# Patient Record
Sex: MALE | Race: WHITE | NOT HISPANIC OR LATINO | URBAN - METROPOLITAN AREA
[De-identification: names, ages, dates, MRNs, and addresses within clinical notes are randomized per-mention and may not be internally consistent; named-entity substitution may affect disease eponyms.]

---

## 2024-03-05 ENCOUNTER — APPOINTMENT (OUTPATIENT)
Dept: URBAN - METROPOLITAN AREA CLINIC 204 | Age: 74
Setting detail: DERMATOLOGY
End: 2024-03-06

## 2024-03-05 PROCEDURE — 17000 DESTRUCT PREMALG LESION: CPT

## 2024-03-05 PROCEDURE — 17003 DESTRUCT PREMALG LES 2-14: CPT

## 2024-03-05 PROCEDURE — OTHER MIPS QUALITY: OTHER

## 2024-03-05 PROCEDURE — 99213 OFFICE O/P EST LOW 20 MIN: CPT | Mod: 25

## 2024-03-05 NOTE — PROCEDURE: MIPS QUALITY
Quality 226: Preventive Care And Screening: Tobacco Use: Screening And Cessation Intervention: Patient screened for tobacco use and is an ex/non-smoker
Additional Notes: Documentation for MIPS  purposes only. Full Patient visit note from paper chart is available for review and also scanned in EMA chart.
Detail Level: Detailed
Quality 47: Advance Care Plan: Advance Care Planning discussed and documented; advance care plan or surrogate decision maker documented in the medical record.
Quality 130: Documentation Of Current Medications In The Medical Record: Current Medications Documented

## 2024-09-05 ENCOUNTER — APPOINTMENT (OUTPATIENT)
Dept: PRIMARY CARE | Facility: CLINIC | Age: 74
End: 2024-09-05

## 2024-09-05 VITALS
BODY MASS INDEX: 34.39 KG/M2 | HEIGHT: 70 IN | WEIGHT: 240.2 LBS | SYSTOLIC BLOOD PRESSURE: 118 MMHG | DIASTOLIC BLOOD PRESSURE: 66 MMHG | HEART RATE: 60 BPM

## 2024-09-05 DIAGNOSIS — I48.0 PAROXYSMAL ATRIAL FIBRILLATION (MULTI): ICD-10-CM

## 2024-09-05 DIAGNOSIS — Z12.5 PROSTATE CANCER SCREENING: ICD-10-CM

## 2024-09-05 DIAGNOSIS — I10 PRIMARY HYPERTENSION: ICD-10-CM

## 2024-09-05 DIAGNOSIS — G47.33 OSA ON CPAP: ICD-10-CM

## 2024-09-05 DIAGNOSIS — E78.5 DYSLIPIDEMIA: Primary | ICD-10-CM

## 2024-09-05 PROCEDURE — 3078F DIAST BP <80 MM HG: CPT | Performed by: STUDENT IN AN ORGANIZED HEALTH CARE EDUCATION/TRAINING PROGRAM

## 2024-09-05 PROCEDURE — 99204 OFFICE O/P NEW MOD 45 MIN: CPT | Performed by: STUDENT IN AN ORGANIZED HEALTH CARE EDUCATION/TRAINING PROGRAM

## 2024-09-05 PROCEDURE — 1159F MED LIST DOCD IN RCRD: CPT | Performed by: STUDENT IN AN ORGANIZED HEALTH CARE EDUCATION/TRAINING PROGRAM

## 2024-09-05 PROCEDURE — 1036F TOBACCO NON-USER: CPT | Performed by: STUDENT IN AN ORGANIZED HEALTH CARE EDUCATION/TRAINING PROGRAM

## 2024-09-05 PROCEDURE — 3074F SYST BP LT 130 MM HG: CPT | Performed by: STUDENT IN AN ORGANIZED HEALTH CARE EDUCATION/TRAINING PROGRAM

## 2024-09-05 PROCEDURE — 1160F RVW MEDS BY RX/DR IN RCRD: CPT | Performed by: STUDENT IN AN ORGANIZED HEALTH CARE EDUCATION/TRAINING PROGRAM

## 2024-09-05 RX ORDER — METOPROLOL SUCCINATE 50 MG/1
50 TABLET, EXTENDED RELEASE ORAL DAILY
COMMUNITY

## 2024-09-05 RX ORDER — ATORVASTATIN CALCIUM 10 MG/1
10 TABLET, FILM COATED ORAL DAILY
COMMUNITY

## 2024-09-05 RX ORDER — FLECAINIDE ACETATE 150 MG/1
150 TABLET ORAL 2 TIMES DAILY
COMMUNITY

## 2024-09-05 RX ORDER — APIXABAN 5 MG/1
5 TABLET, FILM COATED ORAL 2 TIMES DAILY
COMMUNITY

## 2024-09-05 ASSESSMENT — PATIENT HEALTH QUESTIONNAIRE - PHQ9
SUM OF ALL RESPONSES TO PHQ9 QUESTIONS 1 AND 2: 0
1. LITTLE INTEREST OR PLEASURE IN DOING THINGS: NOT AT ALL
2. FEELING DOWN, DEPRESSED OR HOPELESS: NOT AT ALL

## 2024-09-05 NOTE — PROGRESS NOTES
"Subjective   Patient ID: Carlton Martinez is a 74 y.o. male who presents for establish care. Previous provider Dr Leonard. Having some sob with exertion.     HPI  A fib - follows with [facility] cardiology in [place], scheduled to see in 3/2025 when returns from [place], last eval 3/2024, originally diagnosed about 6yr ago, managed on BB, flecainide, and Eliquis, when in a fib has SOB and feels flu-y, states episodes are sporadic, on average 1 every 3-4mo, symptoms improve with rest, last about 12hr on average, had stress test 5/2022 which was wnl,  having some dyspnea on exertion for at least a few years, has some wheezing which is only present when bending at the waist    HLD - managed on statin which he tolerates without adverse effects, tries to be mindful of diet, is active but no formal exercise    HTN - BP at goal, feeling well at this time aside from occasional dyspnea    MARIE - managed on CPAP, follows with Dr. Moseley    Prostate Cancer Screening: no fhx, due  Colon Cancer Screening: no fhx, asymptomatic, Dr. Fonesca 1/4/2018  Tobacco: never  Alcohol: denies  Recreational Drugs: denies  Immunizations: considering RSV, otherwise utd    Review of Systems   Constitutional:  Negative for chills and fever.   HENT:  Negative for congestion and rhinorrhea.    Eyes:  Negative for redness.   Respiratory:  Positive for shortness of breath (intermittent). Negative for cough.    Cardiovascular:  Negative for chest pain, palpitations and leg swelling.   Gastrointestinal:  Negative for abdominal pain, blood in stool, constipation, diarrhea, nausea and vomiting.   Genitourinary:  Negative for dysuria and flank pain.   Musculoskeletal:  Negative for arthralgias and myalgias.   Skin:  Negative for rash.   Neurological:  Negative for dizziness and headaches.   Psychiatric/Behavioral:  Negative for dysphoric mood. The patient is not nervous/anxious.      Objective   /66   Pulse 60   Ht 1.778 m (5' 10\")   Wt 109 kg (240 lb " 3.2 oz)   BMI 34.47 kg/m²     Physical Exam  Vitals reviewed.   Constitutional:       General: He is not in acute distress.     Appearance: Normal appearance.   HENT:      Head: Normocephalic and atraumatic.   Eyes:      General: No scleral icterus.     Conjunctiva/sclera: Conjunctivae normal.   Cardiovascular:      Rate and Rhythm: Normal rate and regular rhythm.   Pulmonary:      Effort: Pulmonary effort is normal. No respiratory distress.      Breath sounds: Normal breath sounds.   Abdominal:      General: Bowel sounds are normal. There is no distension.      Palpations: Abdomen is soft.      Tenderness: There is no abdominal tenderness. There is no guarding or rebound.   Musculoskeletal:         General: No deformity.      Cervical back: Normal range of motion and neck supple.   Skin:     General: Skin is warm and dry.      Findings: No rash.   Neurological:      General: No focal deficit present.      Mental Status: He is alert.   Psychiatric:         Mood and Affect: Mood normal.         Behavior: Behavior normal.       Assessment/Plan   Problem List Items Addressed This Visit             ICD-10-CM    Primary hypertension I10     BP at goal. No changes today.         Paroxysmal atrial fibrillation (Multi) I48.0     Occasionally symptomatic. Asymptomatic today. Currently following with [facility] cardiology in [place]. Using shared decision making, we decided to transition cardiology care to [place] for convenience. Referral placed. No changes today. Continue BB, flecainide, Eliquis. Return precautions reviewed.          Relevant Medications    metoprolol succinate XL (Toprol-XL) 50 mg 24 hr tablet    Other Relevant Orders    CBC and Auto Differential    Follow Up In Primary Care - Medicare Annual    Referral to Cardiology    MARIE on CPAP G47.33     Continue close follow up with sleep medicine as previously scheduled.         Dyslipidemia - Primary E78.5     Continue statin. Will update labs and will follow  up with results. Dietary modifications and recommendation for 150 minutes of moderate-intensity exercise per week reviewed.          Relevant Orders    Comprehensive Metabolic Panel    Lipid Panel    Follow Up In Primary Care - Medicare Annual    Lipid Panel     Other Visit Diagnoses         Codes    Prostate cancer screening     Z12.5    Relevant Orders    Prostate Specific Antigen    Follow Up In Primary Care - Medicare Annual        Follow up in 6mo for recheck, sooner if needed.

## 2024-09-15 PROBLEM — I10 PRIMARY HYPERTENSION: Status: ACTIVE | Noted: 2024-09-15

## 2024-09-15 PROBLEM — E78.5 DYSLIPIDEMIA: Status: ACTIVE | Noted: 2024-09-15

## 2024-09-15 PROBLEM — G47.33 OSA ON CPAP: Status: ACTIVE | Noted: 2024-09-15

## 2024-09-15 PROBLEM — I48.0 PAROXYSMAL ATRIAL FIBRILLATION (MULTI): Status: ACTIVE | Noted: 2024-09-15

## 2024-09-15 ASSESSMENT — ENCOUNTER SYMPTOMS
ARTHRALGIAS: 0
BLOOD IN STOOL: 0
PALPITATIONS: 0
SHORTNESS OF BREATH: 1
CHILLS: 0
CONSTIPATION: 0
NERVOUS/ANXIOUS: 0
NAUSEA: 0
DYSPHORIC MOOD: 0
DIARRHEA: 0
HEADACHES: 0
FEVER: 0
VOMITING: 0
FLANK PAIN: 0
ABDOMINAL PAIN: 0
RHINORRHEA: 0
EYE REDNESS: 0
COUGH: 0
DIZZINESS: 0
MYALGIAS: 0
DYSURIA: 0

## 2024-09-15 NOTE — ASSESSMENT & PLAN NOTE
Occasionally symptomatic. Asymptomatic today. Currently following with [facility] cardiology in [place]. Using shared decision making, we decided to transition cardiology care to [place] for convenience. Referral placed. No changes today. Continue BB, flecainide, Eliquis. Return precautions reviewed.

## 2024-09-15 NOTE — ASSESSMENT & PLAN NOTE
Continue statin. Will update labs and will follow up with results. Dietary modifications and recommendation for 150 minutes of moderate-intensity exercise per week reviewed.

## 2024-10-22 ENCOUNTER — TELEPHONE (OUTPATIENT)
Dept: PRIMARY CARE | Facility: CLINIC | Age: 74
End: 2024-10-22
Payer: MEDICARE

## 2024-10-22 NOTE — TELEPHONE ENCOUNTER
----- Message from Jinny Sierra sent at 10/7/2024  9:02 PM EDT -----  Please let Oli know that his labs overall look good. Cholesterol looks pretty good. Will improve even more to our goal of LDL <70 if he is mindful of fat content in diet. LDL is currently 84. Aside from a mild decrease in GFR at 72, metabolic panel otherwise looks great. Blood counts and prostate are in normal range. Would not change anything. Thank you

## 2024-12-12 ENCOUNTER — TELEPHONE (OUTPATIENT)
Dept: PRIMARY CARE | Facility: CLINIC | Age: 74
End: 2024-12-12
Payer: MEDICARE

## 2024-12-12 DIAGNOSIS — U07.1 COVID-19: Primary | ICD-10-CM

## 2024-12-12 RX ORDER — BENZONATATE 100 MG/1
100 CAPSULE ORAL 3 TIMES DAILY PRN
Qty: 30 CAPSULE | Refills: 0 | Status: SHIPPED | OUTPATIENT
Start: 2024-12-12

## 2024-12-12 RX ORDER — GUAIFENESIN 600 MG/1
1200 TABLET, EXTENDED RELEASE ORAL 2 TIMES DAILY PRN
Qty: 60 TABLET | Refills: 0 | Status: SHIPPED | OUTPATIENT
Start: 2024-12-12 | End: 2025-12-12

## 2024-12-12 NOTE — TELEPHONE ENCOUNTER
Oli called in and left , said that he did a Home Covid test 2 days ago and tested positive, seems to be getting worse so he is asking if you could give him Rx for Paxlovid. Please advise. Thank you!

## 2025-03-05 ENCOUNTER — TELEPHONE (OUTPATIENT)
Dept: PRIMARY CARE | Facility: CLINIC | Age: 75
End: 2025-03-05
Payer: MEDICARE

## 2025-03-05 NOTE — TELEPHONE ENCOUNTER
He called and is asking for a recommendation and a referral to a new pulmonologist. Currently sees Dr Moseley but would like to see someone else.

## 2025-03-06 DIAGNOSIS — G47.33 OSA ON CPAP: Primary | ICD-10-CM

## 2025-03-24 ENCOUNTER — APPOINTMENT (OUTPATIENT)
Dept: PRIMARY CARE | Facility: CLINIC | Age: 75
End: 2025-03-24
Payer: MEDICARE

## 2025-03-24 VITALS
BODY MASS INDEX: 34.9 KG/M2 | SYSTOLIC BLOOD PRESSURE: 116 MMHG | DIASTOLIC BLOOD PRESSURE: 72 MMHG | WEIGHT: 243.8 LBS | HEART RATE: 64 BPM | HEIGHT: 70 IN

## 2025-03-24 DIAGNOSIS — G47.33 OSA ON CPAP: ICD-10-CM

## 2025-03-24 DIAGNOSIS — Z00.00 ROUTINE GENERAL MEDICAL EXAMINATION AT HEALTH CARE FACILITY: Primary | ICD-10-CM

## 2025-03-24 DIAGNOSIS — E78.5 DYSLIPIDEMIA: ICD-10-CM

## 2025-03-24 DIAGNOSIS — E66.09 CLASS 1 OBESITY DUE TO EXCESS CALORIES WITH SERIOUS COMORBIDITY AND BODY MASS INDEX (BMI) OF 34.0 TO 34.9 IN ADULT: ICD-10-CM

## 2025-03-24 DIAGNOSIS — E66.811 CLASS 1 OBESITY DUE TO EXCESS CALORIES WITH SERIOUS COMORBIDITY AND BODY MASS INDEX (BMI) OF 34.0 TO 34.9 IN ADULT: ICD-10-CM

## 2025-03-24 DIAGNOSIS — R06.09 DYSPNEA ON EXERTION: ICD-10-CM

## 2025-03-24 DIAGNOSIS — I10 PRIMARY HYPERTENSION: ICD-10-CM

## 2025-03-24 DIAGNOSIS — I48.0 PAROXYSMAL ATRIAL FIBRILLATION (MULTI): ICD-10-CM

## 2025-03-24 DIAGNOSIS — Z12.5 PROSTATE CANCER SCREENING: ICD-10-CM

## 2025-03-24 RX ORDER — ALBUTEROL SULFATE 90 UG/1
2 INHALANT RESPIRATORY (INHALATION) EVERY 4 HOURS PRN
Qty: 8.5 G | Refills: 1 | Status: SHIPPED | OUTPATIENT
Start: 2025-03-24 | End: 2026-03-24

## 2025-03-24 NOTE — PROGRESS NOTES
"Subjective   Reason for Visit: Carlton Martinez is an 74 y.o. male here for a Medicare Wellness visit.     Past Medical, Surgical, and Family History reviewed and updated in chart.    Reviewed all medications by prescribing practitioner or clinical pharmacist (such as prescriptions, OTCs, herbal therapies and supplements) and documented in the medical record.    HPI  A fib - managed on flecainide 150mg bid, Toprol-XL 50mg every day, Eliquis 5mg bid, he notes chronic dyspnea which is worse with exertion but is otherwise asymptomatic, denies CP, palpitations, HA, LH, he tries to remain active and tries to be mindful of his diet    HLD - managed on Lipitor 10mg every day which he tolerates    Prostate Cancer Screening: due 9/2025  Colon Cancer Screening: Dr. Fonseca 1/4/2018, due 1/2028  Tobacco: never  Alcohol: denies  Recreational Drugs: denies  Immunizations: considering RSV, otherwise utd    Patient Care Team:  Jinny Sierra DO as PCP - General (Family Medicine)  Jinny Sierra DO as PCP [facility]ributed [facility]     Review of Systems   Constitutional:  Negative for chills and fever.   Respiratory:  Positive for shortness of breath. Negative for cough.    Cardiovascular:  Negative for chest pain and palpitations.   Gastrointestinal:  Negative for abdominal pain, constipation, diarrhea, nausea and vomiting.   Neurological:  Negative for headaches.   Psychiatric/Behavioral:  Negative for dysphoric mood. The patient is not nervous/anxious.    All other systems reviewed and are negative.    Objective   Vitals:  /72   Pulse 64   Ht 1.778 m (5' 10\")   Wt 111 kg (243 lb 12.8 oz)   BMI 34.98 kg/m²       Physical Exam  Constitutional:       Appearance: Normal appearance.   HENT:      Head: Normocephalic and atraumatic.   Eyes:      General: No scleral icterus.     Conjunctiva/sclera: Conjunctivae normal.   Cardiovascular:      Rate and Rhythm: Normal rate and regular rhythm.      Heart sounds: No murmur " heard.  Pulmonary:      Effort: Pulmonary effort is normal. No respiratory distress.      Breath sounds: Normal breath sounds.   Musculoskeletal:         General: No swelling. Normal range of motion.      Cervical back: Normal range of motion and neck supple.   Skin:     General: Skin is warm and dry.      Findings: No rash.   Neurological:      General: No focal deficit present.      Mental Status: He is alert.   Psychiatric:         Mood and Affect: Mood normal.         Behavior: Behavior normal.       Assessment & Plan  Dyslipidemia  - Managed on Lipitor 10mg every day  - Continue statin  Orders:    Follow Up In Primary Care - Medicare Annual    Lipid Panel; Future    Comprehensive Metabolic Panel; Future    CBC and Auto Differential; Future    Follow Up In Primary Care - [facility]; Future    Follow Up In Primary Care - [facility]; Future    Paroxysmal atrial fibrillation (Multi)  - Managed on flecainide 150mg bid, Toprol-XL 50mg every day, Eliquis 5mg bid  - Continue close follow up with cardiology as previously scheduled.   Orders:    Follow Up In Primary Care - Medicare Annual    Follow Up In Primary Care - Established; Future    Follow Up In Primary Care - [facility]; Future    Prostate cancer screening    Orders:    Follow Up In Primary Care - Medicare Annual    Prostate Specific Antigen; Future    Follow Up In Primary Care - [facility]; Future    Follow Up In Primary Care - [facility]; Future    Routine general medical examination at health care facility    Orders:    Follow Up In Primary Care - [facility]; Future    Follow Up In Primary Care - [facility]; Future    Dyspnea on exertion  - Exam unrevealing today  - He follows with cardiology  - We reviewed that deconditioning can contribute  - Dietary modifications and recommendation for 150 minutes of moderate-intensity exercise per week reviewed.   - Using shared decision making, we decided to add rescue inhaler that he may try as needed  Orders:     albuterol (ProAir HFA) 90 mcg/actuation inhaler; Inhale 2 puffs every 4 hours if needed for wheezing or shortness of breath.    Follow Up In Primary Care - Established; Future    MARIE on CPAP  - Continue close follow up with sleep medicine as previously scheduled.         Primary hypertension  - Managed on Toprol-XL 50mg every day  - BP at goal  - No changes today         Class 1 obesity due to excess calories with serious comorbidity and body mass index (BMI) of 34.0 to 34.9 in adult         Follow up in 6mo for recheck, sooner if needed. Labs prior to appt.

## 2025-03-24 NOTE — ASSESSMENT & PLAN NOTE
- Managed on flecainide 150mg bid, Toprol-XL 50mg every day, Eliquis 5mg bid  - Continue close follow up with cardiology as previously scheduled.   Orders:    Follow Up In Primary Care - Medicare Annual    Follow Up In Primary Care - Established; Future    Follow Up In Primary Care - Established; Future

## 2025-03-24 NOTE — ASSESSMENT & PLAN NOTE
- Managed on Lipitor 10mg every day  - Continue statin  Orders:    Follow Up In Primary Care - Medicare Annual    Lipid Panel; Future    Comprehensive Metabolic Panel; Future    CBC and Auto Differential; Future    Follow Up In Primary Care - Established; Future    Follow Up In Primary Care - Established; Future

## 2025-04-01 ENCOUNTER — APPOINTMENT (OUTPATIENT)
Dept: URBAN - METROPOLITAN AREA CLINIC 204 | Age: 75
Setting detail: DERMATOLOGY
End: 2025-04-03

## 2025-04-01 DIAGNOSIS — L82.1 OTHER SEBORRHEIC KERATOSIS: ICD-10-CM

## 2025-04-01 DIAGNOSIS — L28.1 PRURIGO NODULARIS: ICD-10-CM

## 2025-04-01 DIAGNOSIS — L81.4 OTHER MELANIN HYPERPIGMENTATION: ICD-10-CM

## 2025-04-01 DIAGNOSIS — L57.0 ACTINIC KERATOSIS: ICD-10-CM

## 2025-04-01 PROCEDURE — OTHER SUNSCREEN RECOMMENDATIONS: OTHER

## 2025-04-01 PROCEDURE — OTHER LIQUID NITROGEN: OTHER

## 2025-04-01 PROCEDURE — 99214 OFFICE O/P EST MOD 30 MIN: CPT | Mod: 25

## 2025-04-01 PROCEDURE — 17003 DESTRUCT PREMALG LES 2-14: CPT

## 2025-04-01 PROCEDURE — OTHER REASSURANCE: OTHER

## 2025-04-01 PROCEDURE — OTHER PRESCRIPTION MEDICATION MANAGEMENT: OTHER

## 2025-04-01 PROCEDURE — 17000 DESTRUCT PREMALG LESION: CPT

## 2025-04-01 PROCEDURE — OTHER MIPS QUALITY: OTHER

## 2025-04-01 ASSESSMENT — LOCATION DETAILED DESCRIPTION DERM
LOCATION DETAILED: RIGHT PROXIMAL POSTERIOR UPPER ARM
LOCATION DETAILED: SUPERIOR THORACIC SPINE
LOCATION DETAILED: LEFT PROXIMAL POSTERIOR UPPER ARM
LOCATION DETAILED: RIGHT DISTAL DORSAL FOREARM
LOCATION DETAILED: LEFT INFERIOR CENTRAL MALAR CHEEK
LOCATION DETAILED: LEFT LATERAL MANDIBULAR CHEEK
LOCATION DETAILED: LEFT CENTRAL MALAR CHEEK
LOCATION DETAILED: LEFT PROXIMAL DORSAL FOREARM
LOCATION DETAILED: RIGHT POSTERIOR SHOULDER
LOCATION DETAILED: LEFT POSTERIOR SHOULDER
LOCATION DETAILED: LEFT CENTRAL ZYGOMA

## 2025-04-01 ASSESSMENT — LOCATION ZONE DERM
LOCATION ZONE: ARM
LOCATION ZONE: TRUNK
LOCATION ZONE: FACE

## 2025-04-01 ASSESSMENT — LOCATION SIMPLE DESCRIPTION DERM
LOCATION SIMPLE: LEFT SHOULDER
LOCATION SIMPLE: RIGHT FOREARM
LOCATION SIMPLE: UPPER BACK
LOCATION SIMPLE: LEFT FOREARM
LOCATION SIMPLE: RIGHT SHOULDER
LOCATION SIMPLE: LEFT ZYGOMA
LOCATION SIMPLE: LEFT UPPER ARM
LOCATION SIMPLE: RIGHT UPPER ARM
LOCATION SIMPLE: LEFT CHEEK

## 2025-04-01 NOTE — PROCEDURE: PRESCRIPTION MEDICATION MANAGEMENT
Plan: Discussed augmented betamethasone. Pt declines at this time. Not itchy. Picking is habitual.
Detail Level: Simple
Render In Strict Bullet Format?: No

## 2025-04-01 NOTE — PROCEDURE: LIQUID NITROGEN
Detail Level: Detailed
Render Post-Care Instructions In Note?: no
Consent: The patient's consent was obtained including but not limited to risks of crusting, blistering, scarring, pigmentary change.
Show Aperture Variable?: Yes
Number Of Freeze-Thaw Cycles: 1 freeze-thaw cycle
Duration Of Freeze Thaw-Cycle (Seconds): 5
Post-Care Instructions: Pt may apply Vaseline to crusted or scabbing areas.

## 2025-04-05 PROBLEM — R06.09 DYSPNEA ON EXERTION: Status: ACTIVE | Noted: 2025-04-05

## 2025-04-05 PROBLEM — E66.09 CLASS 1 OBESITY DUE TO EXCESS CALORIES WITH SERIOUS COMORBIDITY AND BODY MASS INDEX (BMI) OF 34.0 TO 34.9 IN ADULT: Status: ACTIVE | Noted: 2025-04-05

## 2025-04-05 PROBLEM — E66.811 CLASS 1 OBESITY DUE TO EXCESS CALORIES WITH SERIOUS COMORBIDITY AND BODY MASS INDEX (BMI) OF 34.0 TO 34.9 IN ADULT: Status: ACTIVE | Noted: 2025-04-05

## 2025-04-05 ASSESSMENT — ACTIVITIES OF DAILY LIVING (ADL)
DOING_HOUSEWORK: INDEPENDENT
TAKING_MEDICATION: INDEPENDENT
GROCERY_SHOPPING: INDEPENDENT
MANAGING_FINANCES: INDEPENDENT
DRESSING: INDEPENDENT
BATHING: INDEPENDENT

## 2025-04-05 ASSESSMENT — ENCOUNTER SYMPTOMS
CHILLS: 0
FEVER: 0
SHORTNESS OF BREATH: 1
CONSTIPATION: 0
ABDOMINAL PAIN: 0
DIARRHEA: 0
DYSPHORIC MOOD: 0
PALPITATIONS: 0
NAUSEA: 0
COUGH: 0
NERVOUS/ANXIOUS: 0
VOMITING: 0
HEADACHES: 0

## 2025-04-05 ASSESSMENT — PATIENT HEALTH QUESTIONNAIRE - PHQ9
SUM OF ALL RESPONSES TO PHQ9 QUESTIONS 1 AND 2: 0
2. FEELING DOWN, DEPRESSED OR HOPELESS: NOT AT ALL
1. LITTLE INTEREST OR PLEASURE IN DOING THINGS: NOT AT ALL

## 2025-04-06 NOTE — ASSESSMENT & PLAN NOTE
- Exam unrevealing today  - He follows with cardiology  - We reviewed that deconditioning can contribute  - Dietary modifications and recommendation for 150 minutes of moderate-intensity exercise per week reviewed.   - Using shared decision making, we decided to add rescue inhaler that he may try as needed  Orders:    albuterol (ProAir HFA) 90 mcg/actuation inhaler; Inhale 2 puffs every 4 hours if needed for wheezing or shortness of breath.    Follow Up In Primary Care - Established; Future

## 2025-09-24 ENCOUNTER — APPOINTMENT (OUTPATIENT)
Dept: PRIMARY CARE | Facility: CLINIC | Age: 75
End: 2025-09-24
Payer: MEDICARE